# Patient Record
Sex: FEMALE | Race: WHITE | HISPANIC OR LATINO | URBAN - METROPOLITAN AREA
[De-identification: names, ages, dates, MRNs, and addresses within clinical notes are randomized per-mention and may not be internally consistent; named-entity substitution may affect disease eponyms.]

---

## 2019-02-19 ENCOUNTER — EMERGENCY (EMERGENCY)
Facility: HOSPITAL | Age: 59
LOS: 1 days | Discharge: HOME | End: 2019-02-19
Attending: EMERGENCY MEDICINE | Admitting: EMERGENCY MEDICINE
Payer: COMMERCIAL

## 2019-02-19 VITALS
WEIGHT: 136.91 LBS | RESPIRATION RATE: 18 BRPM | HEIGHT: 60 IN | OXYGEN SATURATION: 99 % | DIASTOLIC BLOOD PRESSURE: 97 MMHG | HEART RATE: 87 BPM | TEMPERATURE: 98 F | SYSTOLIC BLOOD PRESSURE: 120 MMHG

## 2019-02-19 VITALS
TEMPERATURE: 98 F | SYSTOLIC BLOOD PRESSURE: 106 MMHG | HEART RATE: 80 BPM | OXYGEN SATURATION: 99 % | DIASTOLIC BLOOD PRESSURE: 68 MMHG | RESPIRATION RATE: 16 BRPM

## 2019-02-19 LAB
ALBUMIN SERPL ELPH-MCNC: 3.7 G/DL — SIGNIFICANT CHANGE UP (ref 3.3–5)
ALP SERPL-CCNC: 108 U/L — SIGNIFICANT CHANGE UP (ref 30–120)
ALT FLD-CCNC: 24 U/L DA — SIGNIFICANT CHANGE UP (ref 10–60)
ANION GAP SERPL CALC-SCNC: 10 MMOL/L — SIGNIFICANT CHANGE UP (ref 5–17)
APPEARANCE UR: CLEAR — SIGNIFICANT CHANGE UP
AST SERPL-CCNC: 17 U/L — SIGNIFICANT CHANGE UP (ref 10–40)
BASOPHILS # BLD AUTO: 0.03 K/UL — SIGNIFICANT CHANGE UP (ref 0–0.2)
BASOPHILS NFR BLD AUTO: 0.2 % — SIGNIFICANT CHANGE UP (ref 0–2)
BILIRUB SERPL-MCNC: 0.2 MG/DL — SIGNIFICANT CHANGE UP (ref 0.2–1.2)
BILIRUB UR-MCNC: NEGATIVE — SIGNIFICANT CHANGE UP
BUN SERPL-MCNC: 22 MG/DL — SIGNIFICANT CHANGE UP (ref 7–23)
CALCIUM SERPL-MCNC: 9.9 MG/DL — SIGNIFICANT CHANGE UP (ref 8.4–10.5)
CHLORIDE SERPL-SCNC: 103 MMOL/L — SIGNIFICANT CHANGE UP (ref 96–108)
CO2 SERPL-SCNC: 27 MMOL/L — SIGNIFICANT CHANGE UP (ref 22–31)
COLOR SPEC: YELLOW — SIGNIFICANT CHANGE UP
CREAT SERPL-MCNC: 0.77 MG/DL — SIGNIFICANT CHANGE UP (ref 0.5–1.3)
DIFF PNL FLD: ABNORMAL
EOSINOPHIL # BLD AUTO: 0.12 K/UL — SIGNIFICANT CHANGE UP (ref 0–0.5)
EOSINOPHIL NFR BLD AUTO: 0.9 % — SIGNIFICANT CHANGE UP (ref 0–6)
GLUCOSE SERPL-MCNC: 119 MG/DL — HIGH (ref 70–99)
GLUCOSE UR QL: NEGATIVE MG/DL — SIGNIFICANT CHANGE UP
HCT VFR BLD CALC: 39.2 % — SIGNIFICANT CHANGE UP (ref 34.5–45)
HGB BLD-MCNC: 12.2 G/DL — SIGNIFICANT CHANGE UP (ref 11.5–15.5)
IMM GRANULOCYTES NFR BLD AUTO: 0.3 % — SIGNIFICANT CHANGE UP (ref 0–1.5)
KETONES UR-MCNC: NEGATIVE — SIGNIFICANT CHANGE UP
LEUKOCYTE ESTERASE UR-ACNC: NEGATIVE — SIGNIFICANT CHANGE UP
LYMPHOCYTES # BLD AUTO: 15.3 % — SIGNIFICANT CHANGE UP (ref 13–44)
LYMPHOCYTES # BLD AUTO: 2.01 K/UL — SIGNIFICANT CHANGE UP (ref 1–3.3)
MCHC RBC-ENTMCNC: 26.2 PG — LOW (ref 27–34)
MCHC RBC-ENTMCNC: 31.1 GM/DL — LOW (ref 32–36)
MCV RBC AUTO: 84.1 FL — SIGNIFICANT CHANGE UP (ref 80–100)
MONOCYTES # BLD AUTO: 1.02 K/UL — HIGH (ref 0–0.9)
MONOCYTES NFR BLD AUTO: 7.8 % — SIGNIFICANT CHANGE UP (ref 2–14)
NEUTROPHILS # BLD AUTO: 9.94 K/UL — HIGH (ref 1.8–7.4)
NEUTROPHILS NFR BLD AUTO: 75.5 % — SIGNIFICANT CHANGE UP (ref 43–77)
NITRITE UR-MCNC: NEGATIVE — SIGNIFICANT CHANGE UP
NRBC # BLD: 0 /100 WBCS — SIGNIFICANT CHANGE UP (ref 0–0)
PH UR: 6 — SIGNIFICANT CHANGE UP (ref 5–8)
PLATELET # BLD AUTO: 296 K/UL — SIGNIFICANT CHANGE UP (ref 150–400)
POTASSIUM SERPL-MCNC: 4.1 MMOL/L — SIGNIFICANT CHANGE UP (ref 3.5–5.3)
POTASSIUM SERPL-SCNC: 4.1 MMOL/L — SIGNIFICANT CHANGE UP (ref 3.5–5.3)
PROT SERPL-MCNC: 7.3 G/DL — SIGNIFICANT CHANGE UP (ref 6–8.3)
PROT UR-MCNC: NEGATIVE MG/DL — SIGNIFICANT CHANGE UP
RBC # BLD: 4.66 M/UL — SIGNIFICANT CHANGE UP (ref 3.8–5.2)
RBC # FLD: 14.4 % — SIGNIFICANT CHANGE UP (ref 10.3–14.5)
SODIUM SERPL-SCNC: 140 MMOL/L — SIGNIFICANT CHANGE UP (ref 135–145)
SP GR SPEC: 1.01 — SIGNIFICANT CHANGE UP (ref 1.01–1.02)
UROBILINOGEN FLD QL: NEGATIVE MG/DL — SIGNIFICANT CHANGE UP
WBC # BLD: 13.16 K/UL — HIGH (ref 3.8–10.5)
WBC # FLD AUTO: 13.16 K/UL — HIGH (ref 3.8–10.5)

## 2019-02-19 PROCEDURE — 99285 EMERGENCY DEPT VISIT HI MDM: CPT

## 2019-02-19 PROCEDURE — 80053 COMPREHEN METABOLIC PANEL: CPT

## 2019-02-19 PROCEDURE — 36415 COLL VENOUS BLD VENIPUNCTURE: CPT

## 2019-02-19 PROCEDURE — 74176 CT ABD & PELVIS W/O CONTRAST: CPT

## 2019-02-19 PROCEDURE — 99284 EMERGENCY DEPT VISIT MOD MDM: CPT

## 2019-02-19 PROCEDURE — 96375 TX/PRO/DX INJ NEW DRUG ADDON: CPT

## 2019-02-19 PROCEDURE — 81001 URINALYSIS AUTO W/SCOPE: CPT

## 2019-02-19 PROCEDURE — 85027 COMPLETE CBC AUTOMATED: CPT

## 2019-02-19 PROCEDURE — 96374 THER/PROPH/DIAG INJ IV PUSH: CPT

## 2019-02-19 PROCEDURE — 74176 CT ABD & PELVIS W/O CONTRAST: CPT | Mod: 26

## 2019-02-19 RX ORDER — KETOROLAC TROMETHAMINE 30 MG/ML
30 SYRINGE (ML) INJECTION ONCE
Qty: 0 | Refills: 0 | Status: DISCONTINUED | OUTPATIENT
Start: 2019-02-19 | End: 2019-02-19

## 2019-02-19 RX ORDER — FAMOTIDINE 10 MG/ML
20 INJECTION INTRAVENOUS ONCE
Qty: 0 | Refills: 0 | Status: COMPLETED | OUTPATIENT
Start: 2019-02-19 | End: 2019-02-19

## 2019-02-19 RX ORDER — ONDANSETRON 8 MG/1
4 TABLET, FILM COATED ORAL ONCE
Qty: 0 | Refills: 0 | Status: COMPLETED | OUTPATIENT
Start: 2019-02-19 | End: 2019-02-19

## 2019-02-19 RX ORDER — SODIUM CHLORIDE 9 MG/ML
1000 INJECTION INTRAMUSCULAR; INTRAVENOUS; SUBCUTANEOUS ONCE
Qty: 0 | Refills: 0 | Status: COMPLETED | OUTPATIENT
Start: 2019-02-19 | End: 2019-02-19

## 2019-02-19 RX ORDER — SODIUM CHLORIDE 9 MG/ML
3 INJECTION INTRAMUSCULAR; INTRAVENOUS; SUBCUTANEOUS ONCE
Qty: 0 | Refills: 0 | Status: COMPLETED | OUTPATIENT
Start: 2019-02-19 | End: 2019-02-19

## 2019-02-19 RX ADMIN — Medication 30 MILLIGRAM(S): at 05:01

## 2019-02-19 RX ADMIN — FAMOTIDINE 20 MILLIGRAM(S): 10 INJECTION INTRAVENOUS at 04:55

## 2019-02-19 RX ADMIN — SODIUM CHLORIDE 1000 MILLILITER(S): 9 INJECTION INTRAMUSCULAR; INTRAVENOUS; SUBCUTANEOUS at 05:02

## 2019-02-19 RX ADMIN — ONDANSETRON 4 MILLIGRAM(S): 8 TABLET, FILM COATED ORAL at 04:50

## 2019-02-19 RX ADMIN — SODIUM CHLORIDE 3 MILLILITER(S): 9 INJECTION INTRAMUSCULAR; INTRAVENOUS; SUBCUTANEOUS at 05:02

## 2019-02-19 RX ADMIN — SODIUM CHLORIDE 1000 MILLILITER(S): 9 INJECTION INTRAMUSCULAR; INTRAVENOUS; SUBCUTANEOUS at 06:02

## 2019-02-19 RX ADMIN — Medication 30 MILLIGRAM(S): at 05:02

## 2019-02-19 NOTE — ED ADULT NURSE NOTE - PMH
Anxiety    BRCA Gene Positive    Hypothyroidism    Osteoporosis    UTI (urinary tract infection)  recently diagnosed; On Cipro antibiotics for 10 days started 10/23/13.

## 2019-02-19 NOTE — ED PROVIDER NOTE - PROGRESS NOTE DETAILS
pt reevalutaed, feeling better, pain has resolved, results reviewed with pt, recommend follow up with pmd, follow up urine culture, return if any symtpoms worsen

## 2019-02-19 NOTE — ED PROVIDER NOTE - OBJECTIVE STATEMENT
59yo female who presents with back pain. pt c/o sudden onset of b/l lower back pain with abd pain, constant, bloating, with nausea, no vomiting or diarrhea, no fever, chillls, pt took motrin with no relief.

## 2019-02-19 NOTE — ED ADULT NURSE NOTE - NSIMPLEMENTINTERV_GEN_ALL_ED
Implemented All Universal Safety Interventions:  [place] to call system. Call bell, personal items and telephone within reach. Instruct patient to call for assistance. Room bathroom lighting operational. Non-slip footwear when patient is off stretcher. Physically safe environment: no spills, clutter or unnecessary equipment. Stretcher in lowest position, wheels locked, appropriate side rails in place.

## 2019-02-19 NOTE — ED PROVIDER NOTE - CLINICAL SUMMARY MEDICAL DECISION MAKING FREE TEXT BOX
59yo female with flank and abd pain, no hx, check labs, fluids, pain meds, ct r/o stone, vs infectious cause

## 2022-04-15 ENCOUNTER — EMERGENCY (EMERGENCY)
Facility: HOSPITAL | Age: 62
LOS: 1 days | Discharge: HOME | End: 2022-04-15
Attending: EMERGENCY MEDICINE | Admitting: EMERGENCY MEDICINE
Payer: COMMERCIAL

## 2022-04-15 VITALS
WEIGHT: 136.47 LBS | OXYGEN SATURATION: 99 % | TEMPERATURE: 98 F | HEIGHT: 60 IN | DIASTOLIC BLOOD PRESSURE: 62 MMHG | SYSTOLIC BLOOD PRESSURE: 134 MMHG | RESPIRATION RATE: 15 BRPM | HEART RATE: 58 BPM

## 2022-04-15 VITALS
SYSTOLIC BLOOD PRESSURE: 131 MMHG | OXYGEN SATURATION: 99 % | DIASTOLIC BLOOD PRESSURE: 65 MMHG | RESPIRATION RATE: 15 BRPM | TEMPERATURE: 98 F | HEART RATE: 62 BPM

## 2022-04-15 LAB
ALBUMIN SERPL ELPH-MCNC: 3.7 G/DL — SIGNIFICANT CHANGE UP (ref 3.3–5)
ALP SERPL-CCNC: 89 U/L — SIGNIFICANT CHANGE UP (ref 30–120)
ALT FLD-CCNC: 26 U/L DA — SIGNIFICANT CHANGE UP (ref 10–60)
ANION GAP SERPL CALC-SCNC: 8 MMOL/L — SIGNIFICANT CHANGE UP (ref 5–17)
APPEARANCE UR: CLEAR — SIGNIFICANT CHANGE UP
AST SERPL-CCNC: 22 U/L — SIGNIFICANT CHANGE UP (ref 10–40)
BACTERIA # UR AUTO: NEGATIVE — SIGNIFICANT CHANGE UP
BASOPHILS # BLD AUTO: 0.02 K/UL — SIGNIFICANT CHANGE UP (ref 0–0.2)
BASOPHILS NFR BLD AUTO: 0.2 % — SIGNIFICANT CHANGE UP (ref 0–2)
BILIRUB DIRECT SERPL-MCNC: 0 MG/DL — SIGNIFICANT CHANGE UP (ref 0–0.3)
BILIRUB INDIRECT FLD-MCNC: 0.5 MG/DL — SIGNIFICANT CHANGE UP (ref 0.2–1)
BILIRUB SERPL-MCNC: 0.5 MG/DL — SIGNIFICANT CHANGE UP (ref 0.2–1.2)
BILIRUB UR-MCNC: NEGATIVE — SIGNIFICANT CHANGE UP
BUN SERPL-MCNC: 14 MG/DL — SIGNIFICANT CHANGE UP (ref 7–23)
CALCIUM SERPL-MCNC: 9.2 MG/DL — SIGNIFICANT CHANGE UP (ref 8.4–10.5)
CHLORIDE SERPL-SCNC: 103 MMOL/L — SIGNIFICANT CHANGE UP (ref 96–108)
CO2 SERPL-SCNC: 24 MMOL/L — SIGNIFICANT CHANGE UP (ref 22–31)
COLOR SPEC: YELLOW — SIGNIFICANT CHANGE UP
CREAT SERPL-MCNC: 0.54 MG/DL — SIGNIFICANT CHANGE UP (ref 0.5–1.3)
DIFF PNL FLD: ABNORMAL
EGFR: 106 ML/MIN/1.73M2 — SIGNIFICANT CHANGE UP
EOSINOPHIL # BLD AUTO: 0.25 K/UL — SIGNIFICANT CHANGE UP (ref 0–0.5)
EOSINOPHIL NFR BLD AUTO: 3 % — SIGNIFICANT CHANGE UP (ref 0–6)
EPI CELLS # UR: ABNORMAL
GLUCOSE SERPL-MCNC: 98 MG/DL — SIGNIFICANT CHANGE UP (ref 70–99)
GLUCOSE UR QL: NEGATIVE MG/DL — SIGNIFICANT CHANGE UP
HCT VFR BLD CALC: 40.4 % — SIGNIFICANT CHANGE UP (ref 34.5–45)
HGB BLD-MCNC: 12.7 G/DL — SIGNIFICANT CHANGE UP (ref 11.5–15.5)
IMM GRANULOCYTES NFR BLD AUTO: 0.2 % — SIGNIFICANT CHANGE UP (ref 0–1.5)
KETONES UR-MCNC: NEGATIVE — SIGNIFICANT CHANGE UP
LEUKOCYTE ESTERASE UR-ACNC: NEGATIVE — SIGNIFICANT CHANGE UP
LIDOCAIN IGE QN: 70 U/L — LOW (ref 73–393)
LYMPHOCYTES # BLD AUTO: 2.35 K/UL — SIGNIFICANT CHANGE UP (ref 1–3.3)
LYMPHOCYTES # BLD AUTO: 28.2 % — SIGNIFICANT CHANGE UP (ref 13–44)
MCHC RBC-ENTMCNC: 25.4 PG — LOW (ref 27–34)
MCHC RBC-ENTMCNC: 31.4 GM/DL — LOW (ref 32–36)
MCV RBC AUTO: 80.8 FL — SIGNIFICANT CHANGE UP (ref 80–100)
MONOCYTES # BLD AUTO: 0.68 K/UL — SIGNIFICANT CHANGE UP (ref 0–0.9)
MONOCYTES NFR BLD AUTO: 8.2 % — SIGNIFICANT CHANGE UP (ref 2–14)
NEUTROPHILS # BLD AUTO: 5.01 K/UL — SIGNIFICANT CHANGE UP (ref 1.8–7.4)
NEUTROPHILS NFR BLD AUTO: 60.2 % — SIGNIFICANT CHANGE UP (ref 43–77)
NITRITE UR-MCNC: NEGATIVE — SIGNIFICANT CHANGE UP
NRBC # BLD: 0 /100 WBCS — SIGNIFICANT CHANGE UP (ref 0–0)
PH UR: 5 — SIGNIFICANT CHANGE UP (ref 5–8)
PLATELET # BLD AUTO: 351 K/UL — SIGNIFICANT CHANGE UP (ref 150–400)
POTASSIUM SERPL-MCNC: 4.1 MMOL/L — SIGNIFICANT CHANGE UP (ref 3.5–5.3)
POTASSIUM SERPL-SCNC: 4.1 MMOL/L — SIGNIFICANT CHANGE UP (ref 3.5–5.3)
PROT SERPL-MCNC: 7.7 G/DL — SIGNIFICANT CHANGE UP (ref 6–8.3)
PROT UR-MCNC: 15 MG/DL
RBC # BLD: 5 M/UL — SIGNIFICANT CHANGE UP (ref 3.8–5.2)
RBC # FLD: 14.5 % — SIGNIFICANT CHANGE UP (ref 10.3–14.5)
RBC CASTS # UR COMP ASSIST: SIGNIFICANT CHANGE UP /HPF (ref 0–4)
SODIUM SERPL-SCNC: 135 MMOL/L — SIGNIFICANT CHANGE UP (ref 135–145)
SP GR SPEC: 1.02 — SIGNIFICANT CHANGE UP (ref 1.01–1.02)
UROBILINOGEN FLD QL: NEGATIVE MG/DL — SIGNIFICANT CHANGE UP
WBC # BLD: 8.33 K/UL — SIGNIFICANT CHANGE UP (ref 3.8–10.5)
WBC # FLD AUTO: 8.33 K/UL — SIGNIFICANT CHANGE UP (ref 3.8–10.5)
WBC UR QL: NEGATIVE — SIGNIFICANT CHANGE UP

## 2022-04-15 PROCEDURE — 74177 CT ABD & PELVIS W/CONTRAST: CPT | Mod: MA

## 2022-04-15 PROCEDURE — 74177 CT ABD & PELVIS W/CONTRAST: CPT | Mod: 26,MA

## 2022-04-15 PROCEDURE — 99284 EMERGENCY DEPT VISIT MOD MDM: CPT | Mod: 25

## 2022-04-15 PROCEDURE — 80053 COMPREHEN METABOLIC PANEL: CPT

## 2022-04-15 PROCEDURE — 83690 ASSAY OF LIPASE: CPT

## 2022-04-15 PROCEDURE — 82248 BILIRUBIN DIRECT: CPT

## 2022-04-15 PROCEDURE — 99285 EMERGENCY DEPT VISIT HI MDM: CPT

## 2022-04-15 PROCEDURE — 96361 HYDRATE IV INFUSION ADD-ON: CPT

## 2022-04-15 PROCEDURE — 87086 URINE CULTURE/COLONY COUNT: CPT

## 2022-04-15 PROCEDURE — 85025 COMPLETE CBC W/AUTO DIFF WBC: CPT

## 2022-04-15 PROCEDURE — 81001 URINALYSIS AUTO W/SCOPE: CPT

## 2022-04-15 PROCEDURE — 96374 THER/PROPH/DIAG INJ IV PUSH: CPT | Mod: XU

## 2022-04-15 PROCEDURE — 36415 COLL VENOUS BLD VENIPUNCTURE: CPT

## 2022-04-15 RX ORDER — MORPHINE SULFATE 50 MG/1
4 CAPSULE, EXTENDED RELEASE ORAL ONCE
Refills: 0 | Status: DISCONTINUED | OUTPATIENT
Start: 2022-04-15 | End: 2022-04-15

## 2022-04-15 RX ORDER — LEVOTHYROXINE SODIUM 125 MCG
1 TABLET ORAL
Qty: 0 | Refills: 0 | DISCHARGE

## 2022-04-15 RX ORDER — SODIUM CHLORIDE 9 MG/ML
500 INJECTION INTRAMUSCULAR; INTRAVENOUS; SUBCUTANEOUS ONCE
Refills: 0 | Status: COMPLETED | OUTPATIENT
Start: 2022-04-15 | End: 2022-04-15

## 2022-04-15 RX ADMIN — MORPHINE SULFATE 4 MILLIGRAM(S): 50 CAPSULE, EXTENDED RELEASE ORAL at 16:44

## 2022-04-15 RX ADMIN — MORPHINE SULFATE 4 MILLIGRAM(S): 50 CAPSULE, EXTENDED RELEASE ORAL at 16:59

## 2022-04-15 RX ADMIN — SODIUM CHLORIDE 500 MILLILITER(S): 9 INJECTION INTRAMUSCULAR; INTRAVENOUS; SUBCUTANEOUS at 17:50

## 2022-04-15 RX ADMIN — SODIUM CHLORIDE 500 MILLILITER(S): 9 INJECTION INTRAMUSCULAR; INTRAVENOUS; SUBCUTANEOUS at 16:42

## 2022-04-15 NOTE — ED ADULT NURSE NOTE - CINV DISCH MEDS REVIEWED YN
Yes

## 2022-04-15 NOTE — ED PROVIDER NOTE - CARE PROVIDER_API CALL
PCP, YOUR  Phone: (   )    -  Fax: (   )    -  Follow Up Time: 4-6 Days    Drea Muñiz  Phone: (   )    -  Fax: (   )    -  Follow Up Time: 4-6 Days

## 2022-04-15 NOTE — ED ADULT NURSE NOTE - OBJECTIVE STATEMENT
pt comes to ED c/o RLQ abd pain x 1 week, worse today.  radiating to right side flank. Pt denies N/V/D, fever, chills, cp or sob. + increased urination, but denies dysuria or hematuria.  hx of kidney stones in the past.

## 2022-04-15 NOTE — ED PROVIDER NOTE - PATIENT PORTAL LINK FT
You can access the FollowMyHealth Patient Portal offered by [facility] by registering at the following website: [email or web address]. By joining [employer]’s FollowMyHealth portal, you will also be able to view your health information using other applications (apps) compatible with our system.

## 2022-04-15 NOTE — ED PROVIDER NOTE - NSFOLLOWUPINSTRUCTIONS_ED_ALL_ED_FT
Abdominal Pain, Adult      Pain in the abdomen (abdominal pain) can be caused by many things. Often, abdominal pain is not serious and it gets better with no treatment or by being treated at home. However, sometimes abdominal pain is serious.    Your health care provider will ask questions about your medical history and do a physical exam to try to determine the cause of your abdominal pain.      Follow these instructions at home:    Medicines     •Take over-the-counter and prescription medicines only as told by your health care provider.      • Do not take a laxative unless told by your health care provider.        General instructions      •Watch your condition for any changes.      •Drink enough fluid to keep your urine pale yellow.      •Keep all follow-up visits as told by your health care provider. This is important.        Contact a health care provider if:    •Your abdominal pain changes or gets worse.      •You are not hungry or you lose weight without trying.      •You are constipated or have diarrhea for more than 2–3 days.      •You have pain when you urinate or have a bowel movement.      •Your abdominal pain wakes you up at night.      •Your pain gets worse with meals, after eating, or with certain foods.      •You are vomiting and cannot keep anything down.      •You have a fever.      •You have blood in your urine.        Get help right away if:    •Your pain does not go away as soon as your health care provider told you to expect.      •You cannot stop vomiting.      •Your pain is only in areas of the abdomen, such as the right side or the left lower portion of the abdomen. Pain on the right side could be caused by appendicitis.      •You have bloody or black stools, or stools that look like tar.      •You have severe pain, cramping, or bloating in your abdomen.    •You have signs of dehydration, such as:  •Dark urine, very little urine, or no urine.      •Cracked lips.      •Dry mouth.      •Sunken eyes.      •Sleepiness.      •Weakness.        •You have trouble breathing or chest pain.        Summary    •Often, abdominal pain is not serious and it gets better with no treatment or by being treated at home. However, sometimes abdominal pain is serious.      •Watch your condition for any changes.      •Take over-the-counter and prescription medicines only as told by your health care provider.      •Contact a health care provider if your abdominal pain changes or gets worse.      •Get help right away if you have severe pain, cramping, or bloating in your abdomen.      This information is not intended to replace advice given to you by your health care provider. Make sure you discuss any questions you have with your health care provider.

## 2022-04-15 NOTE — ED PROVIDER NOTE - CARE PLAN
1 Principal Discharge DX:	Abdominal pain  Assessment and plan of treatment:	pt reassessed, feeling better.  ED work up including blood work, CT a/p with IV con and UA normal.  Pt is NV intact with no signs of systemic infection, VSS, non toxic.  Here with spouse, will dc home and f/u PCP (Drea Muñiz), return as needed.  Secondary Diagnosis:	Muscle strain

## 2022-04-15 NOTE — ED PROVIDER NOTE - OBJECTIVE STATEMENT
61 f presents to ED for RLQ abd pain x1 week.  C/o constant pain worsened by movement, wrapping around to her right lower back, and down the front of her leg.  Pt denies assoc NVD, fever, appetite changes.  Notes frequent urination, however denies dysuria or hematuria.  Reports similar symptoms years ago when she was dx'd w kidney stones.  C/o pain that keeps her up at night and having a hard time finding a comfortable position.  Has attempted tylenol and motrin without adequate relief.

## 2022-04-15 NOTE — ED PROVIDER NOTE - CLINICAL SUMMARY MEDICAL DECISION MAKING FREE TEXT BOX
Aristeo CROCKER for ED attending, Dr. Herrera: 60 y/o F w/ PMHx of hypothyroidism presents to ED c/o worsening constant low back pain and RLQ pain x 1 week. Pt also c/o urinary frequency. Denies fever, chills, n/v/d, dysuria, hematuria, recent fall or trauma. Pt endorses history of kidney stone but states these symptoms feel different. Aristeo CROCKER for ED attending, Dr. Herrera: 60 y/o F w/ PMHx of hypothyroidism presents to ED c/o worsening constant low back pain and RLQ pain x 1 week. Pt also c/o urinary frequency. Denies fever, chills, n/v/d, dysuria, hematuria, recent fall or trauma. Pt endorses history of kidney stone but states these symptoms feel different. No fall / direct trauma. no cough/ uri. No covid exposures. No numb/ting/focal weak. no other acute inj or co.   Exam: MM Moist. neck supple. no meningeal signs. cta bl, no w/r/r. nl cardiac exam. nl non-focal neuro exam. mild tend to RLQ / r flank. No jennie / guard. no  hsm. no cvat. no c/c/e. no other acute findings.  check labs, CT, UA, IVF, reeval

## 2022-04-15 NOTE — ED PROVIDER NOTE - NS ED ATTENDING STATEMENT MOD
This was a shared visit with the KATHI. I reviewed and verified the documentation and independently performed the documented:

## 2022-04-15 NOTE — ED PROVIDER NOTE - PLAN OF CARE
pt reassessed, feeling better.  ED work up including blood work, CT a/p with IV con and UA normal.  Pt is NV intact with no signs of systemic infection, VSS, non toxic.  Here with spouse, will dc home and f/u PCP (Drea Muñiz), return as needed.

## 2022-04-15 NOTE — ED PROVIDER NOTE - PROVIDER TOKENS
FREE:[LAST:[PCP],FIRST:[YOUR],PHONE:[(   )    -],FAX:[(   )    -],FOLLOWUP:[4-6 Days]],FREE:[LAST:[Cucciara],FIRST:[Drea],PHONE:[(   )    -],FAX:[(   )    -],FOLLOWUP:[4-6 Days]]

## 2022-04-15 NOTE — ED PROVIDER NOTE - CPE EDP SKIN NORM
Physical Therapy Discharge Summary    Current Functional Limitations: minimal difficulty with squatting    OBJECTIVE   remeasurements as noted in attached daily treatment note    Outcome Measure: (Outcome Scoring) Lower Extremity Functional Scale   Initial Outcome Score: 42  Discharge Outcome Score: 60    ASSESSMENT   To date the patient has made gains as expected as reported. Patient has made excellent progress with therapy.  Patient denies any significant functional limitations.  Patient feels comfortable continuing with independent home exercise program at this time.  All goals have been met.  Discharge from skilled therapy with instructions/recommendations: continue with independent home exercise program.    PLAN    Discharge from physical therapy to independent home exercise program at this time.  Patient to call with any questions/concerns.     Goals: To be obtained by end of this plan of care:  1. Patient will be independent with progressed and modified home exercise program- goal met   2. Improve involved strength to 4+/5- goal met   3. Improve involved range of motion to 5-115 degrees- goal met   through improvements listed above patient will:  4. Be able to ambulate community distances on even and uneven terrain without assistive device- goal met   5. Be able to complete toilet, car, low surface transfers without pain/difficulty- goal met   6. Be able to ambulate safely and timely across the street- goal met   7. Lower Extremity Functional Scale: Patient will complete form to reflect an improved score from initial score of 42 to greater than or equal to 53 (0=extreme difficulty; 80=no difficulty) to indicate pt reported improvement in function/disability/impairment (minimal detectable change: 9 points).- goal met     G-Code:  G-Code Score ABN form  : Goal Mobility Limitation,  CI - 1% to 19% impaired, limited or restricted  : D/C Mobility Limitation,  CI - 1% to 19% impaired, limited or  restricted  Modifier based on outcome measure(s)/functional testing/clinical judgement as listed above    Discharge Measures:   Total Number of Visits: 10  Treatment Category: Total Knee Replacement - Date Of Surgery: 9/20/2018  Outcome Measure: above  Primary Clinician: Tracy Mendoza     Physical Therapy Daily Treatment    Visit Count: 10    Plan of Care: 11/2/2018 Through: 1/25/2019  Insurance Information: $100 used at the time of evaluation  Referred by: Rigoberto Weinstein MD; Next provider visit (if known/scheduled): none scheduled  Medical Diagnosis (from order):  Right knee pain s/p right total knee arthroplasty    Date of Surgery: 9/20/2018 Surgery Performed: RIGHT TOTAL KNEE ARTHROPLASTY - Right  Physician Guidelines/Precautions: none   Chart reviewed at time of initial evaluation (relevant co-morbidities, allergies, tests and medications listed): history of colon cancer, osteoporosis, anemia     SUBJECTIVE   Current Pain (0-10 scale): 0  Patient has resumed driving without issue.  Patient hopes to get back into volunteering.  Patient denies any significant pain, only just an occasional ache.  Patient reports compliance with home exercise program.  Patient notes she still fatigues easily with prolonged ambulation.    OBJECTIVE   Right knee active ROM: 5 ° - 115 °     Right knee strength:  4+/5    Ambulates without an assistive device with non-antalgic gait    Good patellar and scar tissue mobility    Outcome Measures: (Outcome Scoring)  Lower Extremity Functional Scale: LEFS Calculated Total: 60 (0=extreme difficulty; 80=no difficulty)     Treatment   Therapeutic Exercise:   Nu-Step x 6 minutes @ Level 5    Slantboard stretches    Standing hamstring stretch at stairs    Standing knee flexion stretch at stairs    6 inch forward step-ups with right upper extremity support     6 inch anterior step-downs with bilateral upper extremity support     Reviewed remainder of home exercise program- Patient has no  questions.    Therapeutic activity:  Educated Patient on today's objective data compared to previous measurements, updated plan of care and goals with Patient.   Encouraged continued compliance with home exercise program for further improvement.       Manual Therapy:   Supine soft tissue mobilization to right distal quadriceps,medial/lateral and posterior knee to reduce tightness    Home Program:   quadriceps sets    straight leg raise   heel slides  Partial squats     Writer verbally educated the patient and received verbal consent from the patient on hand placement, positioning of patient, and techniques to be performed today including obtaining objective measures and how they are pertinent to the patient's plan of care.      Suggestions for next session as indicated:   Discharge from physical therapy to independent home exercise program at this time.  Patient to call with any questions/concerns.     ASSESSMENT   Pain after treatment (patient reported, 0-10 scale): 0  Result of above outlined education: Verbalizes understanding and Demonstrates understanding   Doing well overall.  Patient appears pleased with progress made with therapy.    THERAPY DAILY BILLING   Insurance: MEDICARE 2. MARGARITO/BCJOSSE    Timed Procedures:  Manual Therapy, 10 minutes  Therapeutic Activity, 15 minutes  Therapeutic Exercise, 18 minutes    Untimed Procedures:  No untimed codes were used on this date of service    Total Treatment Time: 43 minutes   normal...

## 2022-04-15 NOTE — ED PROVIDER NOTE - NSICDXPASTMEDICALHX_GEN_ALL_CORE_FT
PAST MEDICAL HISTORY:  Anxiety     BRCA Gene Positive     Hypothyroidism     Osteoporosis     UTI (urinary tract infection) 10/23/13.

## 2022-04-16 LAB
CULTURE RESULTS: SIGNIFICANT CHANGE UP
SPECIMEN SOURCE: SIGNIFICANT CHANGE UP

## 2022-05-04 ENCOUNTER — APPOINTMENT (OUTPATIENT)
Dept: ORTHOPEDIC SURGERY | Facility: CLINIC | Age: 62
End: 2022-05-04
Payer: COMMERCIAL

## 2022-05-04 ENCOUNTER — RESULT CHARGE (OUTPATIENT)
Age: 62
End: 2022-05-04

## 2022-05-04 VITALS — BODY MASS INDEX: 26.7 KG/M2 | HEIGHT: 60 IN | WEIGHT: 136 LBS

## 2022-05-04 PROCEDURE — 99204 OFFICE O/P NEW MOD 45 MIN: CPT

## 2022-05-04 PROCEDURE — 72110 X-RAY EXAM L-2 SPINE 4/>VWS: CPT

## 2022-05-04 PROCEDURE — 72170 X-RAY EXAM OF PELVIS: CPT

## 2022-05-04 PROCEDURE — 72120 X-RAY BEND ONLY L-S SPINE: CPT

## 2022-05-04 RX ORDER — METHYLPREDNISOLONE 4 MG/1
4 TABLET ORAL
Qty: 1 | Refills: 1 | Status: ACTIVE | COMMUNITY
Start: 2022-05-04 | End: 1900-01-01

## 2022-05-04 NOTE — PHYSICAL EXAM
[Right lower extremity above knee] : right lower extremity above knee [] : negative straight leg raise

## 2022-05-04 NOTE — HISTORY OF PRESENT ILLNESS
[Lower back] : lower back [Gradual] : gradual [5] : 5 [1] : 2 [Localized] : localized [Radiating] : radiating [Stabbing] : stabbing [Throbbing] : throbbing [Intermittent] : intermittent [Meds] : meds [Sitting] : sitting [Standing] : standing [Bending forward] : bending forward [de-identified] : 5/4/22: 62 yo RHD F here for the evaluation of her low back; Symptoms worsening over the last month. Pain is to the right side of the back and shoots down the anterior thigh with numbness. Not much below the knee - No loss of b/b control.\par \par  She saw her PCP 2 weeks ago where she was given a muscle relaxer and then referred here. 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

## 2022-05-04 NOTE — DISCUSSION/SUMMARY
[de-identified] : LUMBAR radiculopathy \par Indicated for MRI L spine/PT\par MDP \par fu to review the MRI WITH ME

## 2022-05-17 ENCOUNTER — FORM ENCOUNTER (OUTPATIENT)
Age: 62
End: 2022-05-17

## 2022-05-18 ENCOUNTER — APPOINTMENT (OUTPATIENT)
Dept: MRI IMAGING | Facility: CLINIC | Age: 62
End: 2022-05-18
Payer: COMMERCIAL

## 2022-05-18 PROCEDURE — 72148 MRI LUMBAR SPINE W/O DYE: CPT

## 2022-06-01 ENCOUNTER — APPOINTMENT (OUTPATIENT)
Dept: ORTHOPEDIC SURGERY | Facility: CLINIC | Age: 62
End: 2022-06-01
Payer: COMMERCIAL

## 2022-06-01 DIAGNOSIS — M54.16 RADICULOPATHY, LUMBAR REGION: ICD-10-CM

## 2022-06-01 DIAGNOSIS — M51.16 INTERVERTEBRAL DISC DISORDERS WITH RADICULOPATHY, LUMBAR REGION: ICD-10-CM

## 2022-06-01 DIAGNOSIS — M47.816 SPONDYLOSIS W/OUT MYELOPATHY OR RADICULOPATHY, LUMBAR REGION: ICD-10-CM

## 2022-06-01 PROCEDURE — 99214 OFFICE O/P EST MOD 30 MIN: CPT

## 2022-06-01 RX ORDER — CYCLOBENZAPRINE HYDROCHLORIDE 5 MG/1
5 TABLET, FILM COATED ORAL
Qty: 40 | Refills: 0 | Status: ACTIVE | COMMUNITY
Start: 2022-06-01 | End: 1900-01-01

## 2022-06-01 NOTE — DISCUSSION/SUMMARY
[de-identified] : reviewed the MRI with her - would rec PT for that - no surgical target - if PT not helpful rec pain management

## 2022-06-01 NOTE — HISTORY OF PRESENT ILLNESS
[Lower back] : lower back [Gradual] : gradual [Sudden] : sudden [Burning] : burning [Shooting] : shooting [Stabbing] : stabbing [Intermittent] : intermittent [5] : 5 [1] : 2 [Localized] : localized [Radiating] : radiating [Throbbing] : throbbing [Meds] : meds [Sitting] : sitting [Standing] : standing [Bending forward] : bending forward [de-identified] : 5/4/22: 60 yo RHD F here for the evaluation of her low back; Symptoms worsening over the last month. Pain is to the right side of the back and shoots down the anterior thigh with numbness. Not much below the knee - No loss of b/b control.\par \par  She saw her PCP 2 weeks ago where she was given a muscle relaxer and then referred here. 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 MDP; fu to review the MRI."  pain remains in the lwoer back and into the buttock - legs okay - temp relief with the steroids \par \par MRI L-spine 5/18/22\par 1. Convex right curvature.\par 2. Diffuse loss of disc signal and height.\par 3. T12-L1: Facet hypertrophy.\par 4. L1-L2: Facet hypertrophy and ligamentum flavum hypertrophy.\par 5. L2-L3: Broad bulge, facet hypertrophy, and ligamentum flavum hypertrophy with inferior foraminal stenosis.\par 6. L3-L4: Broad bulge, facet hypertrophy, and ligamentum flavum hypertrophy.\par 7. L4-L5: Broad bulge, facet hypertrophy, and ligamentum flavum hypertrophy.\par 8. L5-S1: Bulge, facet hypertrophy, and ligamentum flavum hypertrophy with facet effusion. Epidural lipomatosis with \par mild central stenosis. [] : Post Surgical Visit: no [FreeTextEntry1] : l spine  [FreeTextEntry5] : no injury  [FreeTextEntry7] : right leg  [FreeTextEntry9] : muscle relaxer,aleve [de-identified] : xrays mris  [de-identified] : nothing

## 2022-10-03 NOTE — ED PROVIDER NOTE - NS ED ATTENDING STATEMENT MOD
Impression: S/P YAG Capsulotomy (Yttrium Aluminum Wedgewood) OS - 5 Days. Encounter for surgical aftercare following surgery on a sense organ  Z48.810. Plan: Patient continues to heal well from YAG PC. No inflammation present today. VA improved. Monitor.
Attending Only

## 2023-05-24 NOTE — ED ADULT NURSE NOTE - NEURO WDL
cont pt's home xanax dose- dose confirmed Alert and oriented to person, place and time, memory intact, behavior appropriate to situation, PERRL.

## 2025-05-15 ENCOUNTER — APPOINTMENT (OUTPATIENT)
Dept: ORTHOPEDIC SURGERY | Facility: CLINIC | Age: 65
End: 2025-05-15
Payer: OTHER MISCELLANEOUS

## 2025-05-15 DIAGNOSIS — Z86.39 PERSONAL HISTORY OF OTHER ENDOCRINE, NUTRITIONAL AND METABOLIC DISEASE: ICD-10-CM

## 2025-05-15 DIAGNOSIS — M79.18 MYALGIA, OTHER SITE: ICD-10-CM

## 2025-05-15 DIAGNOSIS — E11.9 TYPE 2 DIABETES MELLITUS W/OUT COMPLICATIONS: ICD-10-CM

## 2025-05-15 DIAGNOSIS — M17.12 UNILATERAL PRIMARY OSTEOARTHRITIS, LEFT KNEE: ICD-10-CM

## 2025-05-15 DIAGNOSIS — S80.02XA CONTUSION OF LEFT KNEE, INITIAL ENCOUNTER: ICD-10-CM

## 2025-05-15 PROCEDURE — J3490M: CUSTOM

## 2025-05-15 PROCEDURE — 73564 X-RAY EXAM KNEE 4 OR MORE: CPT | Mod: LT

## 2025-05-15 PROCEDURE — 99204 OFFICE O/P NEW MOD 45 MIN: CPT | Mod: 25

## 2025-05-15 PROCEDURE — 20610 DRAIN/INJ JOINT/BURSA W/O US: CPT | Mod: LT
